# Patient Record
Sex: MALE | Race: WHITE
[De-identification: names, ages, dates, MRNs, and addresses within clinical notes are randomized per-mention and may not be internally consistent; named-entity substitution may affect disease eponyms.]

---

## 2018-02-06 ENCOUNTER — HOSPITAL ENCOUNTER (EMERGENCY)
Dept: HOSPITAL 18 - NAV ERS | Age: 28
Discharge: HOME | End: 2018-02-06
Payer: COMMERCIAL

## 2018-02-06 DIAGNOSIS — E78.5: ICD-10-CM

## 2018-02-06 DIAGNOSIS — S61.411A: Primary | ICD-10-CM

## 2018-02-06 DIAGNOSIS — S65.201A: ICD-10-CM

## 2018-02-06 DIAGNOSIS — Z79.899: ICD-10-CM

## 2018-02-06 DIAGNOSIS — I10: ICD-10-CM
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
## 2018-02-06 PROCEDURE — 12002 RPR S/N/AX/GEN/TRNK2.6-7.5CM: CPT

## 2018-02-06 PROCEDURE — 90715 TDAP VACCINE 7 YRS/> IM: CPT

## 2018-02-06 PROCEDURE — 90471 IMMUNIZATION ADMIN: CPT

## 2018-02-06 NOTE — RAD
LEFT HAND THREE VIEWS:

 

HISTORY:

A 27-year-old male with a history of left hand injury and laceration.

 

FINDINGS:

Bandage material overlies the second through fifth proximal fingers.  No evidence for acute fracture 
or dislocation.  No overt metallic density foreign body.

 

IMPRESSION:

Bandage material overlies the second through fifth fingers.  No fracture, dislocation, or overt metal
lic density foreign body.

 

POS: [facility]